# Patient Record
Sex: FEMALE | Race: WHITE
[De-identification: names, ages, dates, MRNs, and addresses within clinical notes are randomized per-mention and may not be internally consistent; named-entity substitution may affect disease eponyms.]

---

## 2018-01-22 ENCOUNTER — HOSPITAL ENCOUNTER (EMERGENCY)
Dept: HOSPITAL 26 - MED | Age: 58
Discharge: HOME | End: 2018-01-22
Payer: COMMERCIAL

## 2018-01-22 VITALS — SYSTOLIC BLOOD PRESSURE: 137 MMHG | DIASTOLIC BLOOD PRESSURE: 84 MMHG

## 2018-01-22 VITALS — HEIGHT: 63 IN | WEIGHT: 137.25 LBS | BODY MASS INDEX: 24.32 KG/M2

## 2018-01-22 VITALS — SYSTOLIC BLOOD PRESSURE: 120 MMHG | DIASTOLIC BLOOD PRESSURE: 68 MMHG

## 2018-01-22 DIAGNOSIS — J11.1: ICD-10-CM

## 2018-01-22 DIAGNOSIS — Z88.2: ICD-10-CM

## 2018-01-22 DIAGNOSIS — J98.01: Primary | ICD-10-CM

## 2018-01-22 PROCEDURE — 99284 EMERGENCY DEPT VISIT MOD MDM: CPT

## 2018-01-22 PROCEDURE — 94640 AIRWAY INHALATION TREATMENT: CPT

## 2018-01-22 PROCEDURE — 96372 THER/PROPH/DIAG INJ SC/IM: CPT

## 2018-01-22 PROCEDURE — 71045 X-RAY EXAM CHEST 1 VIEW: CPT

## 2018-01-22 NOTE — NUR
PT C/O DRY HACKING COUGH, FEVER AND CHILLS X5 DAYS. RR EVEN AND UNLABORED, BL 
BS CLEAR THROUGHOUT. PT AA&OX4.

## 2018-05-07 ENCOUNTER — HOSPITAL ENCOUNTER (INPATIENT)
Dept: HOSPITAL 26 - MED | Age: 58
LOS: 1 days | Discharge: HOME | DRG: 241 | End: 2018-05-08
Attending: INTERNAL MEDICINE | Admitting: INTERNAL MEDICINE
Payer: COMMERCIAL

## 2018-05-07 VITALS — HEIGHT: 62 IN | BODY MASS INDEX: 24.48 KG/M2 | WEIGHT: 133.04 LBS

## 2018-05-07 VITALS — SYSTOLIC BLOOD PRESSURE: 128 MMHG | DIASTOLIC BLOOD PRESSURE: 81 MMHG

## 2018-05-07 VITALS — SYSTOLIC BLOOD PRESSURE: 111 MMHG | DIASTOLIC BLOOD PRESSURE: 75 MMHG

## 2018-05-07 VITALS — DIASTOLIC BLOOD PRESSURE: 85 MMHG | SYSTOLIC BLOOD PRESSURE: 125 MMHG

## 2018-05-07 VITALS — DIASTOLIC BLOOD PRESSURE: 76 MMHG | SYSTOLIC BLOOD PRESSURE: 116 MMHG

## 2018-05-07 DIAGNOSIS — R94.31: ICD-10-CM

## 2018-05-07 DIAGNOSIS — R19.7: ICD-10-CM

## 2018-05-07 DIAGNOSIS — K29.70: Primary | ICD-10-CM

## 2018-05-07 DIAGNOSIS — Z88.2: ICD-10-CM

## 2018-05-07 DIAGNOSIS — R42: ICD-10-CM

## 2018-05-07 DIAGNOSIS — Z98.891: ICD-10-CM

## 2018-05-07 LAB
ALBUMIN FLD-MCNC: 3.9 G/DL (ref 3.4–5)
ANION GAP SERPL CALCULATED.3IONS-SCNC: 13.4 MMOL/L (ref 8–16)
APPEARANCE UR: CLEAR
AST SERPL-CCNC: 20 U/L (ref 15–37)
BASOPHILS # BLD AUTO: 0.1 K/UL (ref 0–0.22)
BASOPHILS NFR BLD AUTO: 0.8 % (ref 0–2)
BILIRUB SERPL-MCNC: 0.3 MG/DL (ref 0–1)
BILIRUB UR QL STRIP: NEGATIVE
BUN SERPL-MCNC: 15 MG/DL (ref 7–18)
CHLORIDE SERPL-SCNC: 102 MMOL/L (ref 98–107)
CK MB SERPL-MCNC: 1.3 NG/ML (ref 0–3.6)
CO2 SERPL-SCNC: 28.3 MMOL/L (ref 21–32)
COLOR UR: YELLOW
CREAT SERPL-MCNC: 1 MG/DL (ref 0.6–1.3)
EOSINOPHIL # BLD AUTO: 0.1 K/UL (ref 0–0.4)
EOSINOPHIL NFR BLD AUTO: 1.6 % (ref 0–4)
ERYTHROCYTE [DISTWIDTH] IN BLOOD BY AUTOMATED COUNT: 14 % (ref 11.6–13.7)
GFR SERPL CREATININE-BSD FRML MDRD: 73 ML/MIN (ref 90–?)
GLUCOSE SERPL-MCNC: 140 MG/DL (ref 74–106)
GLUCOSE UR STRIP-MCNC: NEGATIVE MG/DL
HCT VFR BLD AUTO: 44.2 % (ref 36–48)
HGB BLD-MCNC: 14.7 G/DL (ref 12–16)
HGB UR QL STRIP: (no result)
LEUKOCYTE ESTERASE UR QL STRIP: NEGATIVE
LIPASE SERPL-CCNC: 195 U/L (ref 73–393)
LYMPHOCYTES # BLD AUTO: 2.4 K/UL (ref 2.5–16.5)
LYMPHOCYTES NFR BLD AUTO: 32.6 % (ref 20.5–51.1)
MCH RBC QN AUTO: 29 PG (ref 27–31)
MCHC RBC AUTO-ENTMCNC: 33 G/DL (ref 33–37)
MCV RBC AUTO: 88.4 FL (ref 80–94)
MONOCYTES # BLD AUTO: 0.7 K/UL (ref 0.8–1)
MONOCYTES NFR BLD AUTO: 9.1 % (ref 1.7–9.3)
NEUTROPHILS # BLD AUTO: 4.2 K/UL (ref 1.8–7.7)
NEUTROPHILS NFR BLD AUTO: 55.9 % (ref 42.2–75.2)
NITRITE UR QL STRIP: NEGATIVE
PH UR STRIP: 7 [PH] (ref 5–9)
PLATELET # BLD AUTO: 355 K/UL (ref 140–450)
POTASSIUM SERPL-SCNC: 3.7 MMOL/L (ref 3.5–5.1)
RBC # BLD AUTO: 4.99 MIL/UL (ref 4.2–5.4)
RBC #/AREA URNS HPF: (no result) /HPF (ref 0–5)
SODIUM SERPL-SCNC: 140 MMOL/L (ref 136–145)
WBC # BLD AUTO: 7.5 K/UL (ref 4.8–10.8)
WBC,URINE: (no result) /HPF (ref 0–5)

## 2018-05-07 PROCEDURE — 96374 THER/PROPH/DIAG INJ IV PUSH: CPT

## 2018-05-07 PROCEDURE — G0378 HOSPITAL OBSERVATION PER HR: HCPCS

## 2018-05-07 PROCEDURE — 99218: CPT

## 2018-05-07 PROCEDURE — 96361 HYDRATE IV INFUSION ADD-ON: CPT

## 2018-05-07 PROCEDURE — 99285 EMERGENCY DEPT VISIT HI MDM: CPT

## 2018-05-07 RX ADMIN — MECLIZINE SCH MG: 25 TABLET ORAL at 16:26

## 2018-05-07 RX ADMIN — SODIUM CHLORIDE SCH MLS/HR: 9 INJECTION, SOLUTION INTRAVENOUS at 11:40

## 2018-05-07 RX ADMIN — SODIUM CHLORIDE SCH MLS/HR: 9 INJECTION, SOLUTION INTRAVENOUS at 21:52

## 2018-05-07 RX ADMIN — MECLIZINE SCH MG: 25 TABLET ORAL at 12:52

## 2018-05-07 NOTE — NUR
NS 1000 ML STARTED. IV PATENT, ASYMPTOMATIC, AND INTACT INFUSING IT WELL. PATIENT RESTING IN 
BED, FAMILY MEMBER AT BEDSIDE. NO SIGNS OF DISTRESS OR SOB NOTED ON ROOM AIR. RESPIRATIONS 
EVEN AND UNLABORED. PATIENT DENIES PAIN. SAFETY PRECAUTION IN PLACE, CALL LIGHT WITHIN 
REACH, WILL CONTINUE TO MONITOR PATIENT.

## 2018-05-07 NOTE — NUR
PT. IN BED RESTING , RR EVEN AND UNLABORED. BED IN LOWEST POSITION, SISTER AT 
BEDSIDE . WILL CONTINUE TO MONITOR.

## 2018-05-07 NOTE — NUR
RECEIVED BEDSIDE REPORT FROM DAY SHIFT NURSE, PT IS AAOX4. AMBULATORY. Mongolian SPEAKING. NO 
S/S OF DISTRESS NOTED ON ROOM AIR, IV TO R AC 20G, INTACT, PATENT, FLUSHED WELL. PT DENIES 
PAIN. ORIENTED PT TO ROOM. VITALS TAKEN. INITIAL ASSESSMENT DONE, ALL SAFETY PRECAUTION MET 
WILL CONTINUE TO MONITOR.

## 2018-05-07 NOTE — NUR
REPORT GIVEN TO NIGHT SHIFT NURSE AT BEDSIDE FOR CONTINUITY OF CARE. PATIENT SLEEPING AND IN 
STABLE CONDITION.

## 2018-05-07 NOTE — NUR
Patient will be admitted to care of DR. GARIBAY . Admited to TELEMETRY FLOOR . 
 Will go to room 104B. Belongings list completed.  Report to MINDA CRAIG .

## 2018-05-07 NOTE — NUR
PATIENT RESTING IN BED, NO SIGNS OF DISTRESS OR SOB NOTED ON ROOM AIR. RESPIRATIONS EVEN AND 
UNLABORED. PATIENT DENIES PAIN. SAFETY PRECAUTION IN PLACE, CALL LIGHT WITHIN REACH, WILL 
CONTINUE TO MONITOR PATIENT.

## 2018-05-07 NOTE — NUR
PATIENT ARRIVED VIA [facility] ACCOMPANIED BY 2 ER NURSES. REPORT GIVEN AT BEDSIDE FROM ER RN, 
BRII, FOR CONTINUITY OF CARE. PATIENT AMBULATED FROM [facility] TO BED ON STEADY GAIT. IV ON 
L AC #20G, INTACT, ASYMPTOMATIC, AND PATENT. PATIENT SHOWS NO SIGN OF DISTRESS OR SOB ON 
ROOM AIR. PATIENT DENIES PAIN. BOWEL SOUNDS PRESENT, PATIENT DX IS V/D AND ABNORMAL ECG. 
VITAL SIGNS WITHIN NORMAL LIMITS. ORIENTED PATIENT TO CALL LIGHT, PHONE, AND ROOM. PATIENT 
VERBALIZED UNDERSTANDING. UPDATED PATIENT ON PLAN OF CARE. PATIENT VERBALIZED UNDERSTANDING. 
SAFETY PRECAUTION IN PLACE, CALL LIGHT WITHIN REACH, WILL CONTINUE TO MONITOR PATIENT.

## 2018-05-07 NOTE — NUR
ORDERED MEDICATIONS ADMINISTERED. PATIENT TOLERATING IT WELL. NO SIGNS OF DISTRESS OR SOB 
NOTED ROOM AIR. RESPIRATIONS EVEN AND UNLABORED. PATIENT DENIES PAIN. SAFETY PRECAUTION IN 
PLACE, CALL LIGHT WITHIN REACH, WILL CONTINUE TO MONITOR PATIENT.

## 2018-05-07 NOTE — NUR
PT. CAME INTO ED W/ C/O OF VOMITING AND DIAHRRHEA X 3 DAYS. PT. STATES " I HAVE 
GASTRITIS BUT I HAVE BEEN VOMITING AND HAVING DIAHRRHEA X 3 DAYS". PT. AAOX4, 
SAYS SHE HAS BEEN DRINKING WATER AND EATING SMALL AMOUNTS. PT. DENIES ANY BLOOD 
IN STOOL OR VOMIT. PT. STATES I HAVE ABD PAIN THAT IS A BURNING SENSATION ALL 
OVER THE ABD 8/10. PT. DENIES FEVER, DENIES SOB, DENIES COUGH. SAFETY 
PRECAUTIONS INITIATED. ANKUR JOHNSON NOTIFIED. WILL CONTINUE TO MONITOR. SISTER AT 
BEDSIDE

## 2018-05-08 VITALS — SYSTOLIC BLOOD PRESSURE: 121 MMHG | DIASTOLIC BLOOD PRESSURE: 78 MMHG

## 2018-05-08 VITALS — SYSTOLIC BLOOD PRESSURE: 118 MMHG | DIASTOLIC BLOOD PRESSURE: 76 MMHG

## 2018-05-08 VITALS — DIASTOLIC BLOOD PRESSURE: 64 MMHG | SYSTOLIC BLOOD PRESSURE: 101 MMHG

## 2018-05-08 VITALS — DIASTOLIC BLOOD PRESSURE: 73 MMHG | SYSTOLIC BLOOD PRESSURE: 112 MMHG

## 2018-05-08 LAB
ALBUMIN FLD-MCNC: 3.4 G/DL (ref 3.4–5)
ANION GAP SERPL CALCULATED.3IONS-SCNC: 15.5 MMOL/L (ref 8–16)
AST SERPL-CCNC: 19 U/L (ref 15–37)
BASOPHILS # BLD AUTO: 0 K/UL (ref 0–0.22)
BASOPHILS NFR BLD AUTO: 0.7 % (ref 0–2)
BILIRUB SERPL-MCNC: 0.5 MG/DL (ref 0–1)
BUN SERPL-MCNC: 10 MG/DL (ref 7–18)
CHLORIDE SERPL-SCNC: 107 MMOL/L (ref 98–107)
CK MB SERPL-MCNC: 0.8 NG/ML (ref 0–3.6)
CO2 SERPL-SCNC: 25.3 MMOL/L (ref 21–32)
CREAT SERPL-MCNC: 1 MG/DL (ref 0.6–1.3)
EOSINOPHIL # BLD AUTO: 0.1 K/UL (ref 0–0.4)
EOSINOPHIL NFR BLD AUTO: 1.3 % (ref 0–4)
ERYTHROCYTE [DISTWIDTH] IN BLOOD BY AUTOMATED COUNT: 14.1 % (ref 11.6–13.7)
GFR SERPL CREATININE-BSD FRML MDRD: 73 ML/MIN (ref 90–?)
GLUCOSE SERPL-MCNC: 111 MG/DL (ref 74–106)
HCT VFR BLD AUTO: 39.5 % (ref 36–48)
HGB BLD-MCNC: 13.3 G/DL (ref 12–16)
LYMPHOCYTES # BLD AUTO: 2.7 K/UL (ref 2.5–16.5)
LYMPHOCYTES NFR BLD AUTO: 42.4 % (ref 20.5–51.1)
MCH RBC QN AUTO: 30 PG (ref 27–31)
MCHC RBC AUTO-ENTMCNC: 34 G/DL (ref 33–37)
MCV RBC AUTO: 88.1 FL (ref 80–94)
MONOCYTES # BLD AUTO: 0.5 K/UL (ref 0.8–1)
MONOCYTES NFR BLD AUTO: 7.7 % (ref 1.7–9.3)
NEUTROPHILS # BLD AUTO: 3.1 K/UL (ref 1.8–7.7)
NEUTROPHILS NFR BLD AUTO: 47.9 % (ref 42.2–75.2)
PLATELET # BLD AUTO: 313 K/UL (ref 140–450)
POTASSIUM SERPL-SCNC: 3.8 MMOL/L (ref 3.5–5.1)
RBC # BLD AUTO: 4.48 MIL/UL (ref 4.2–5.4)
SODIUM SERPL-SCNC: 144 MMOL/L (ref 136–145)
WBC # BLD AUTO: 6.5 K/UL (ref 4.8–10.8)

## 2018-05-08 RX ADMIN — SODIUM CHLORIDE SCH MLS/HR: 9 INJECTION, SOLUTION INTRAVENOUS at 08:21

## 2018-05-08 RX ADMIN — MECLIZINE SCH MG: 25 TABLET ORAL at 13:07

## 2018-05-08 RX ADMIN — MECLIZINE SCH MG: 25 TABLET ORAL at 08:21

## 2018-05-08 NOTE — NUR
ORDERED MEDICATIONS ADMINISTERED. PATIENT TOLERATED THEM WELL.   NO SIGNS OF DISTRESS OR SOB 
NOTED ROOM AIR. RESPIRATIONS EVEN AND UNLABORED. PATIENT DENIES PAIN AT THE MOMENT. CHARGE 
NURSE DILAN CALLED DR. JORGE RUSS. HE WILL COME TODAY TO SEE PATIENT. PASSED MESSAGE ON TO 
PATIENT, SHE VERBALIZED UNDERSTANDING. SAFETY PRECAUTION IN PLACE, CALL LIGHT WITHIN REACH, 
WILL CONTINUE TO MONITOR PATIENT.

## 2018-05-08 NOTE — NUR
REPORT RECEIVED AT BEDSIDE FROM NIGHT SHIFT NURSE FOR CONTINUITY OF CARE. PATIENT AWAKE, 
ALERT, AND ORIENTED X4. IV ON L AC #20G, INTACT, ASYMPTOMATIC, AND PATENT INFUSING IVF WELL. 
PATIENT SHOWS NO SIGN OF DISTRESS OR SOB ON ROOM AIR. PATIENT MEDICATED FOR BACK PAIN WITH 
TYLENOL AT 0511, STATES THAT PAIN IS "TOLERABLE". UPDATED BOARD, VERBALIZED PLAN OF CARE 
WITH PATIENT, PATIENT VERBALIZED UNDERSTANDING. SAFETY PRECAUTION IN PLACE, CALL LIGHT 
WITHIN REACH, WILL CONTINUE TO MONITOR PATIENT.

## 2018-05-08 NOTE — NUR
ORDERED MEDICATION ADMINISTERED. PATIENT TOLERATED IT WELL.   NO SIGNS OF DISTRESS OR SOB 
NOTED ON ROOM AIR. RESPIRATIONS EVEN AND UNLABORED. PATIENT DENIES PAIN. SAFETY PRECAUTION 
IN PLACE, CALL LIGHT WITHIN REACH, WILL CONTINUE TO MONITOR PATIENT.

## 2018-05-08 NOTE — NUR
PATIENT RESTING IN BED, NO SIGNS OF DISTRESS OR SOB NOTED ON ROOM AIR. RESPIRATIONS EVEN AND 
UNLABORED. PATIENT DENIES PAIN. NO C/O OF NAUSEA, VOMITING, OR DIARRHEA. SAFETY PRECAUTION 
IN PLACE, CALL LIGHT WITHIN REACH, WILL CONTINUE TO MONITOR PATIENT.

## 2018-05-08 NOTE — NUR
DISCHARGE INSTRUCTION AND EDUCATIVE GIVEN. PATIENT VERBALIZED UNDERSTANDING. IV REMOVED, IV 
CATHETER INTACT, MINIMAL BLOOD NOTED. ID BANDS CUT, TELE MONITOR REMOVED. PATIENT WILL NOW 
CHANGE AND WAIT FOR HER SON TO COME TO PICK HER UP. PATIENT DEMONSTRATES NO SIGNS OF 
DISTRESS OR SOB ON ROOM AIR. PATIENT DENIES PAIN. WILL CONTINUE TO MONITOR PATIENT.

## 2018-05-08 NOTE — NUR
ENDORSED PT CARE TO DAY SHIFT NURSE [place]-RN FOR CONTINUITY OF CARE. PT IN STABLE CONDITION AT 
THIS TIME.

## 2018-05-08 NOTE — NUR
PATIENT AND SON REQUESTED INFORMATION ABOUT ABNORMAL ECG. INFORMED PATIENT ABOUT ACCESSING 
MEDICAL INFORMATION AFTER DISCHARGED. CALLED ADMITTING TO GET PATIENT'S USERNAME AND 
PASSWORD. THEY SAID TO JUST GO TO THE LOBBY, THEY WILL GIVE THEM THE INFORMATION THERE. 
PATIENT AMBULATED OFF FLOOR FLOOR ACCOMPANIED BY RN AND SON. PATIENT TOOK ALL HER BELONGINGS 
WITH HER.  STOPPED BY ADMITTING DESK, SON GAVE HIS EMAIL ADDRESS, THEY SAID THEY WILL EMAIL 
HIM HER USERNAME AND PASSWORD. PATIENT IN STABLE CONDITION.

## 2018-05-08 NOTE — NUR
5/8/18 

RD INITIAL ASSESSMENT COMPLETED



PLEASE REFER TO NUTRITION ASSESSMENT UNDER CARE ACTIVITY FOR ESTIMATED NUTRITIONAL NEEDS. 



1.CONTINUE CLEAR LIQUIDS AS MEDICALLY APPROPRIATE 

2.IF/WHEN MEDICALLY APPROPRIATE TO ADVANCE PT, CONSIDER ADVANCEMENT TO REGULAR DIET AS 
TOLERATED

3.RD TO FOLLOW-UP 2-3 DAYS, HIGH RISK 



EM MANDUJANO, ROSI

## 2018-05-08 NOTE — NUR
PATIENT HAS BEEN SCREENED AND CATEGORIZED AS HIGH NUTRITION RISK. PATIENT WILL BE SEEN 
WITHIN 1-2 DAYS OF ADMISSION.



5/6/18 5/8/18



CLEO MANDUJANO RD


-------------------------------------------------------------------------------

Addendum: 05/08/18 at 1537 by Cleo Mandujano RD

-------------------------------------------------------------------------------

PT WILL BE SEEN WITHIN 1-2 DAYS OF ADMISSION



5/8/18



CLEO MANDUJANO RD

## 2018-12-15 ENCOUNTER — HOSPITAL ENCOUNTER (EMERGENCY)
Dept: HOSPITAL 26 - MED | Age: 58
Discharge: HOME | End: 2018-12-15
Payer: COMMERCIAL

## 2018-12-15 VITALS — SYSTOLIC BLOOD PRESSURE: 132 MMHG | DIASTOLIC BLOOD PRESSURE: 89 MMHG

## 2018-12-15 VITALS — SYSTOLIC BLOOD PRESSURE: 155 MMHG | DIASTOLIC BLOOD PRESSURE: 97 MMHG

## 2018-12-15 VITALS — BODY MASS INDEX: 24.45 KG/M2 | WEIGHT: 138 LBS | HEIGHT: 63 IN

## 2018-12-15 DIAGNOSIS — R11.0: ICD-10-CM

## 2018-12-15 DIAGNOSIS — Z79.899: ICD-10-CM

## 2018-12-15 DIAGNOSIS — R10.11: Primary | ICD-10-CM

## 2018-12-15 DIAGNOSIS — E07.9: ICD-10-CM

## 2018-12-15 PROCEDURE — 99283 EMERGENCY DEPT VISIT LOW MDM: CPT

## 2018-12-15 PROCEDURE — 96372 THER/PROPH/DIAG INJ SC/IM: CPT

## 2018-12-15 PROCEDURE — 81002 URINALYSIS NONAUTO W/O SCOPE: CPT

## 2018-12-15 NOTE — NUR
Patient discharged with v/s stable. Written and verbal after care instructions 
given and explained. 

Patient alert, oriented and verbalized understanding of instructions. 
Ambulatory with . All questions addressed prior to discharge. ID band removed. 
Patient advised to follow up with PMD. Rx of ZOFRAN & MOTRIN given. Patient 
educated on indication of medication including possible reaction and side 
effects. Opportunity to ask questions provided and answered.

## 2018-12-15 NOTE — NUR
bib son with c/o HA, DIZINESS, N/V & ruq radiating to right mid back  x 3 
days;MED  hx--"liver problems...cyst"...biopsy scheduled; h. pylori, 
hypothyroidism. rx--Zofran 4mg, Levothroid 50mcg.

SKIN IS PINK/WARM/DRY; AAOX4 WITH EVEN AND STEADY GAIT; LUNGS CLEAR BL. PATIENT 
STATES PAIN OF 9/10 AT THIS TIME. PATIENT POSITIONED FOR COMFORT; HOB ELEVATED; 
BEDRAILS UP X2; BED DOWN. ER MD MADE AWARE OF PT STATUS.

## 2019-03-02 ENCOUNTER — HOSPITAL ENCOUNTER (EMERGENCY)
Dept: HOSPITAL 26 - MED | Age: 59
Discharge: HOME | End: 2019-03-02
Payer: COMMERCIAL

## 2019-03-02 VITALS — DIASTOLIC BLOOD PRESSURE: 98 MMHG | SYSTOLIC BLOOD PRESSURE: 155 MMHG

## 2019-03-02 VITALS — BODY MASS INDEX: 20.73 KG/M2 | HEIGHT: 63 IN | WEIGHT: 117 LBS

## 2019-03-02 VITALS — SYSTOLIC BLOOD PRESSURE: 150 MMHG | DIASTOLIC BLOOD PRESSURE: 83 MMHG

## 2019-03-02 DIAGNOSIS — Z88.2: ICD-10-CM

## 2019-03-02 DIAGNOSIS — N17.9: Primary | ICD-10-CM

## 2019-03-02 DIAGNOSIS — E07.9: ICD-10-CM

## 2019-03-02 DIAGNOSIS — E86.0: ICD-10-CM

## 2019-03-02 DIAGNOSIS — Z79.899: ICD-10-CM

## 2019-03-02 LAB
ALBUMIN FLD-MCNC: 3 G/DL (ref 3.4–5)
ANION GAP SERPL CALCULATED.3IONS-SCNC: 13.2 MMOL/L (ref 8–16)
AST SERPL-CCNC: 33 U/L (ref 15–37)
BASOPHILS # BLD AUTO: 0.1 K/UL (ref 0–0.22)
BASOPHILS NFR BLD AUTO: 1.9 % (ref 0–2)
BILIRUB SERPL-MCNC: 0.3 MG/DL (ref 0–1)
BUN SERPL-MCNC: 39 MG/DL (ref 7–18)
CHLORIDE SERPL-SCNC: 105 MMOL/L (ref 98–107)
CO2 SERPL-SCNC: 27.3 MMOL/L (ref 21–32)
CREAT SERPL-MCNC: 3.3 MG/DL (ref 0.6–1.3)
EOSINOPHIL # BLD AUTO: 0.1 K/UL (ref 0–0.4)
EOSINOPHIL NFR BLD AUTO: 1 % (ref 0–4)
ERYTHROCYTE [DISTWIDTH] IN BLOOD BY AUTOMATED COUNT: 14.4 % (ref 11.6–13.7)
GFR SERPL CREATININE-BSD FRML MDRD: 18 ML/MIN (ref 90–?)
GLUCOSE SERPL-MCNC: 112 MG/DL (ref 74–106)
HCT VFR BLD AUTO: 51 % (ref 36–48)
HGB BLD-MCNC: 16.8 G/DL (ref 12–16)
LYMPHOCYTES # BLD AUTO: 1.6 K/UL (ref 2.5–16.5)
LYMPHOCYTES NFR BLD AUTO: 23.3 % (ref 20.5–51.1)
MCH RBC QN AUTO: 28 PG (ref 27–31)
MCHC RBC AUTO-ENTMCNC: 33 G/DL (ref 33–37)
MCV RBC AUTO: 84.6 FL (ref 80–94)
MONOCYTES # BLD AUTO: 0.5 K/UL (ref 0.8–1)
MONOCYTES NFR BLD AUTO: 8.1 % (ref 1.7–9.3)
NEUTROPHILS # BLD AUTO: 4.5 K/UL (ref 1.8–7.7)
NEUTROPHILS NFR BLD AUTO: 65.7 % (ref 42.2–75.2)
PLATELET # BLD AUTO: 322 K/UL (ref 140–450)
POTASSIUM SERPL-SCNC: 3.5 MMOL/L (ref 3.5–5.1)
RBC # BLD AUTO: 6.03 MIL/UL (ref 4.2–5.4)
SODIUM SERPL-SCNC: 142 MMOL/L (ref 136–145)
WBC # BLD AUTO: 6.8 K/UL (ref 4.8–10.8)

## 2019-03-02 PROCEDURE — 80053 COMPREHEN METABOLIC PANEL: CPT

## 2019-03-02 PROCEDURE — 99284 EMERGENCY DEPT VISIT MOD MDM: CPT

## 2019-03-02 PROCEDURE — 96360 HYDRATION IV INFUSION INIT: CPT

## 2019-03-02 PROCEDURE — 85025 COMPLETE CBC W/AUTO DIFF WBC: CPT

## 2019-03-02 PROCEDURE — 36415 COLL VENOUS BLD VENIPUNCTURE: CPT

## 2019-03-02 PROCEDURE — 71045 X-RAY EXAM CHEST 1 VIEW: CPT

## 2019-03-02 NOTE — NUR
BIB SON WITH C/O ABNORMAL LABS. PT STATES SHE WAS SENT HER FROM HER DR TO CHECK 
LABS FOR KIDNEY PROBLEMS 



PMH: AMYLOIDOSIS . DENIES N/V/D; SKIN IS PINK/WARM/DRY; AAOX4 WITH EVEN AND 
STEADY GAIT; LUNGS CLEAR BL; HR EVEN AND REGULAR; PT DENIES ANY FEVER, CP, SOB, 
OR COUGH AT THIS TIME; PATIENT STATES PAIN OF 0/10 AT THIS TIME; VSS; PATIENT 
POSITIONED FOR COMFORT; HOB ELEVATED; BEDRAILS UP X2; BED DOWN. ER MD MADE 
AWARE OF PT STATUS.

## 2019-03-16 ENCOUNTER — HOSPITAL ENCOUNTER (INPATIENT)
Dept: HOSPITAL 26 - MED | Age: 59
LOS: 2 days | Discharge: HOME | DRG: 346 | End: 2019-03-18
Attending: INTERNAL MEDICINE | Admitting: INTERNAL MEDICINE
Payer: COMMERCIAL

## 2019-03-16 VITALS — HEIGHT: 64 IN | BODY MASS INDEX: 19.81 KG/M2 | WEIGHT: 116 LBS

## 2019-03-16 VITALS — SYSTOLIC BLOOD PRESSURE: 141 MMHG | DIASTOLIC BLOOD PRESSURE: 90 MMHG

## 2019-03-16 VITALS — SYSTOLIC BLOOD PRESSURE: 128 MMHG | DIASTOLIC BLOOD PRESSURE: 87 MMHG

## 2019-03-16 VITALS — SYSTOLIC BLOOD PRESSURE: 140 MMHG | DIASTOLIC BLOOD PRESSURE: 90 MMHG

## 2019-03-16 DIAGNOSIS — Z79.899: ICD-10-CM

## 2019-03-16 DIAGNOSIS — Z88.2: ICD-10-CM

## 2019-03-16 DIAGNOSIS — K59.00: ICD-10-CM

## 2019-03-16 DIAGNOSIS — E86.0: ICD-10-CM

## 2019-03-16 DIAGNOSIS — K85.10: ICD-10-CM

## 2019-03-16 DIAGNOSIS — K80.20: ICD-10-CM

## 2019-03-16 DIAGNOSIS — E44.0: ICD-10-CM

## 2019-03-16 DIAGNOSIS — N17.0: ICD-10-CM

## 2019-03-16 DIAGNOSIS — E03.9: ICD-10-CM

## 2019-03-16 DIAGNOSIS — E85.9: Primary | ICD-10-CM

## 2019-03-16 LAB
ALBUMIN FLD-MCNC: 2.6 G/DL (ref 3.4–5)
ANION GAP SERPL CALCULATED.3IONS-SCNC: 13.5 MMOL/L (ref 8–16)
AST SERPL-CCNC: 45 U/L (ref 15–37)
BASOPHILS # BLD AUTO: 0.1 K/UL (ref 0–0.22)
BASOPHILS NFR BLD AUTO: 1.1 % (ref 0–2)
BILIRUB SERPL-MCNC: 0.4 MG/DL (ref 0–1)
BUN SERPL-MCNC: 37 MG/DL (ref 7–18)
CHLORIDE SERPL-SCNC: 103 MMOL/L (ref 98–107)
CO2 SERPL-SCNC: 26.2 MMOL/L (ref 21–32)
CREAT SERPL-MCNC: 2.4 MG/DL (ref 0.6–1.3)
EOSINOPHIL # BLD AUTO: 0.1 K/UL (ref 0–0.4)
EOSINOPHIL NFR BLD AUTO: 1.2 % (ref 0–4)
ERYTHROCYTE [DISTWIDTH] IN BLOOD BY AUTOMATED COUNT: 15.2 % (ref 11.6–13.7)
GFR SERPL CREATININE-BSD FRML MDRD: 26 ML/MIN (ref 90–?)
GLUCOSE SERPL-MCNC: 101 MG/DL (ref 74–106)
HCT VFR BLD AUTO: 48.2 % (ref 36–48)
HGB BLD-MCNC: 16.3 G/DL (ref 12–16)
LYMPHOCYTES # BLD AUTO: 2.8 K/UL (ref 2.5–16.5)
LYMPHOCYTES NFR BLD AUTO: 33.7 % (ref 20.5–51.1)
MCH RBC QN AUTO: 29 PG (ref 27–31)
MCHC RBC AUTO-ENTMCNC: 34 G/DL (ref 33–37)
MCV RBC AUTO: 84.5 FL (ref 80–94)
MONOCYTES # BLD AUTO: 0.8 K/UL (ref 0.8–1)
MONOCYTES NFR BLD AUTO: 9.1 % (ref 1.7–9.3)
NEUTROPHILS # BLD AUTO: 4.6 K/UL (ref 1.8–7.7)
NEUTROPHILS NFR BLD AUTO: 54.9 % (ref 42.2–75.2)
PLATELET # BLD AUTO: 383 K/UL (ref 140–450)
POTASSIUM SERPL-SCNC: 3.7 MMOL/L (ref 3.5–5.1)
RBC # BLD AUTO: 5.7 MIL/UL (ref 4.2–5.4)
SODIUM SERPL-SCNC: 139 MMOL/L (ref 136–145)
WBC # BLD AUTO: 8.4 K/UL (ref 4.8–10.8)

## 2019-03-16 RX ADMIN — LANSOPRAZOLE SCH MG: 30 CAPSULE, DELAYED RELEASE ORAL at 09:14

## 2019-03-16 RX ADMIN — LEVOTHYROXINE SODIUM SCH MG: 50 TABLET ORAL at 09:09

## 2019-03-16 RX ADMIN — ENOXAPARIN SODIUM SCH MG: 30 INJECTION SUBCUTANEOUS at 09:00

## 2019-03-16 RX ADMIN — SODIUM CHLORIDE SCH MLS/HR: 9 INJECTION, SOLUTION INTRAVENOUS at 15:54

## 2019-03-16 RX ADMIN — SODIUM CHLORIDE SCH MLS/HR: 9 INJECTION, SOLUTION INTRAVENOUS at 05:16

## 2019-03-16 NOTE — NUR
PATIENT COMPLAINED OF 8/10 ABDOMINAL PAIN; TOLD PATIENT IT WAS TOO EARLY TO GIVE HER NORCO 
TABLET; SUGGESTED MORPHINE INSTEAD DUE TO HER PAIN SCALE. PATIENT AGREED; ADMINISTRATION 
CARRIED OUT. WILL CONTINUE TO MONITOR.

## 2019-03-16 NOTE — NUR
ADMINISTERED MORNING MEDS TO PT. PT TOLERATED WELL. PT REFUSED LOVENOX MEDICATION. I 
EXPLAINED BENEFIT OF MED AND PT VERBALIZED UNDERSTANDING BUT REFUSED IT ANYWAYS. ALL NEEDS 
MET AT THIS TIME. PT DENIES NAUSEA AT THIS TIME. TYLENOL WAS ALSO GIVEN FOR HEADACHE OF 
4/10. PATIENT REFUSED ANY STRONGER PAIN MEDS. ALL OTHER NEEDS MET. PT BED IN LOW POSITION, 
CALL LIGHT WITHIN REACH.

## 2019-03-16 NOTE — NUR
PT IN BED TALKING WITH VISITORS. PT COMPLAINTS OF NO PAIN AT THIS TIME. WILL CONTINUE TO 
ROUND FREQUENTLY.

## 2019-03-16 NOTE — NUR
PT WITH VISITORS IN ROOM. NO PAIN OR DISCOMFORT REPORTED. BED IN LOW POSITION, CALL LIGHT 
WITHIN REACH.

## 2019-03-16 NOTE — NUR
PT ASLEEP IN BED. SONS ARE AT BEDSIDE. PT STABLE, NO SIGNS OF PAIN OR DISTRESS. WILL ROUND 
FREQUENTLY.

## 2019-03-16 NOTE — NUR
RETURNED FROM US.

-------------------------------------------------------------------------------

Addendum: 03/16/19 at 0349 by [facility]

-------------------------------------------------------------------------------

PT RETURNED FROM CT.

## 2019-03-16 NOTE — NUR
Patient will be admitted to care of Dr. Hendrix. Admited to MS.  Will go to 
room 110B. Belongings list completed.  Report to JABARI Shipley.

## 2019-03-16 NOTE — NUR
VARUN. FROM ER FOR ADMISSION TO MED SURGICAL UNIT WITH COMPLAINT OF NAUSEA AND VOMITING FOR 
THREE DAYS NOW. AWAKE, A/OX4. RESPIRATION EVEN AND UNLABORED. AMBULATES INDEPENDENTLY. IV 
SALINE LOCK AT THE LEFT AC G 20, PATENT AND INTACT. DENIES PAIN 0/10. ORIENTED TO HOSPITAL 
ENVIRONMENT, USE OF CALL LIGHT AND TV. VERBALIZED UNDERSTANDING.

## 2019-03-16 NOTE — NUR
PATIENT PRESENTS TO ED WITH C/O NAUSEA, VOMITING AND HEADACHE. 

PT SKIN IS PINK/WARM/DRY; AAOX4 WITH EVEN AND STEADY GAIT; LUNGS CLEAR BL; HR 
EVEN AND REGULAR; PATIENT STATES PAIN OF 8/10 AT THIS TIME; VSS; PATIENT 
POSITIONED FOR COMFORT; HOB ELEVATED; BEDRAILS UP X2; BED DOWN. ER MD MADE 
AWARE OF PT STATUS.

## 2019-03-16 NOTE — NUR
RESTING COMFORTABLY IN BED. NO N/V NOTED. WILL ENDORSE TO AM NURSE FOR ADMISSION. CONDITION 
REMAIN STABLE.

## 2019-03-16 NOTE — NUR
RECEIVED ENDORSEMENT FROM AM SHIFT RN; PT IS AWAKE AND TALKING WITH FAMILY, A/Ox4, ABLE TO 
MAKE NEEDS KNOWN. INTRODUCED SELF, UPDATED BOARD. NO SOB OR DISTRESS NOTED. AMBULATES 
INDEPENDENTLY. IV SITE AT THE LEFT ANTECUBITAL, 20 GAUGE, PATENT AND INTACT. ORIENTED TO 
HOSPITAL ENVIRONMENT. BED IN THE LOWEST POSITION, CALL LIGHT WITHIN REACH. INITIAL 
ASSESSMENT DONE. WILL CONTINUE TO MONITOR.

## 2019-03-16 NOTE — NUR
RECEIVED REPORT FROM NIGHT SHIFT. PT IS AWAKE, A/OX4. RESPIRATION EVEN AND UNLABORED. 
AMBULATES INDEPENDENTLY. IV SALINE LOCK AT THE LEFT AC G 20, PATENT AND INTACT. DENIES PAIN 
0/10. ORIENTED TO HOSPITAL ENVIRONMENT, USE OF CALL LIGHT AND TV. VERBALIZED UNDERSTANDING. 
PT BED IN LOW POSITION. WILL ROUND FREQUENTLY.

## 2019-03-17 VITALS — DIASTOLIC BLOOD PRESSURE: 95 MMHG | SYSTOLIC BLOOD PRESSURE: 146 MMHG

## 2019-03-17 VITALS — DIASTOLIC BLOOD PRESSURE: 88 MMHG | SYSTOLIC BLOOD PRESSURE: 146 MMHG

## 2019-03-17 VITALS — SYSTOLIC BLOOD PRESSURE: 143 MMHG | DIASTOLIC BLOOD PRESSURE: 93 MMHG

## 2019-03-17 VITALS — DIASTOLIC BLOOD PRESSURE: 95 MMHG | SYSTOLIC BLOOD PRESSURE: 148 MMHG

## 2019-03-17 LAB
ALBUMIN FLD-MCNC: 2 G/DL (ref 3.4–5)
ANION GAP SERPL CALCULATED.3IONS-SCNC: 13.9 MMOL/L (ref 8–16)
AST SERPL-CCNC: 29 U/L (ref 15–37)
BASOPHILS # BLD AUTO: 0.1 K/UL (ref 0–0.22)
BASOPHILS NFR BLD AUTO: 1 % (ref 0–2)
BILIRUB SERPL-MCNC: 0.6 MG/DL (ref 0–1)
BUN SERPL-MCNC: 28 MG/DL (ref 7–18)
CHLORIDE SERPL-SCNC: 111 MMOL/L (ref 98–107)
CHOLEST/HDLC SERPL: 8.3 {RATIO} (ref 1–4.5)
CO2 SERPL-SCNC: 23.7 MMOL/L (ref 21–32)
CREAT SERPL-MCNC: 1.8 MG/DL (ref 0.6–1.3)
EOSINOPHIL # BLD AUTO: 0.1 K/UL (ref 0–0.4)
EOSINOPHIL NFR BLD AUTO: 1.3 % (ref 0–4)
ERYTHROCYTE [DISTWIDTH] IN BLOOD BY AUTOMATED COUNT: 15.2 % (ref 11.6–13.7)
GFR SERPL CREATININE-BSD FRML MDRD: 37 ML/MIN (ref 90–?)
GLUCOSE SERPL-MCNC: 99 MG/DL (ref 74–106)
HCT VFR BLD AUTO: 41.7 % (ref 36–48)
HDLC SERPL-MCNC: 33 MG/DL (ref 40–60)
HGB BLD-MCNC: 13.7 G/DL (ref 12–16)
LDLC SERPL CALC-MCNC: 201 MG/DL (ref 60–100)
LYMPHOCYTES # BLD AUTO: 1.8 K/UL (ref 2.5–16.5)
LYMPHOCYTES NFR BLD AUTO: 26.7 % (ref 20.5–51.1)
MAGNESIUM SERPL-MCNC: 2 MG/DL (ref 1.8–2.4)
MCH RBC QN AUTO: 28 PG (ref 27–31)
MCHC RBC AUTO-ENTMCNC: 33 G/DL (ref 33–37)
MCV RBC AUTO: 85.3 FL (ref 80–94)
MONOCYTES # BLD AUTO: 0.5 K/UL (ref 0.8–1)
MONOCYTES NFR BLD AUTO: 7.6 % (ref 1.7–9.3)
NEUTROPHILS # BLD AUTO: 4.2 K/UL (ref 1.8–7.7)
NEUTROPHILS NFR BLD AUTO: 63.4 % (ref 42.2–75.2)
PHOSPHATE SERPL-MCNC: 4.1 MG/DL (ref 2.5–4.9)
PLATELET # BLD AUTO: 327 K/UL (ref 140–450)
POTASSIUM SERPL-SCNC: 3.6 MMOL/L (ref 3.5–5.1)
RBC # BLD AUTO: 4.89 MIL/UL (ref 4.2–5.4)
SODIUM SERPL-SCNC: 145 MMOL/L (ref 136–145)
TRIGL SERPL-MCNC: 209 MG/DL (ref 30–150)
WBC # BLD AUTO: 6.6 K/UL (ref 4.8–10.8)

## 2019-03-17 RX ADMIN — SODIUM CHLORIDE PRN MG: 9 INJECTION, SOLUTION INTRAVENOUS at 07:58

## 2019-03-17 RX ADMIN — DOCUSATE SODIUM SCH MG: 100 CAPSULE, LIQUID FILLED ORAL at 21:30

## 2019-03-17 RX ADMIN — SODIUM CHLORIDE SCH MLS/HR: 9 INJECTION, SOLUTION INTRAVENOUS at 01:23

## 2019-03-17 RX ADMIN — SODIUM CHLORIDE SCH MLS/HR: 9 INJECTION, SOLUTION INTRAVENOUS at 13:05

## 2019-03-17 RX ADMIN — LEVOTHYROXINE SODIUM SCH MG: 50 TABLET ORAL at 06:41

## 2019-03-17 RX ADMIN — SODIUM CHLORIDE SCH MLS/HR: 9 INJECTION, SOLUTION INTRAVENOUS at 22:30

## 2019-03-17 RX ADMIN — LANSOPRAZOLE SCH MG: 30 CAPSULE, DELAYED RELEASE ORAL at 08:00

## 2019-03-17 RX ADMIN — ENOXAPARIN SODIUM SCH MG: 30 INJECTION SUBCUTANEOUS at 08:05

## 2019-03-17 RX ADMIN — SODIUM CHLORIDE PRN MG: 9 INJECTION, SOLUTION INTRAVENOUS at 16:27

## 2019-03-17 RX ADMIN — Medication SCH MG: at 08:00

## 2019-03-17 NOTE — NUR
PATIENT HAS BEEN SCREENED AND CATEGORIZED AS MODERATE NUTRITION RISK. PATIENT WILL BE SEEN 
WITHIN 3-5 DAYS OF ADMISSION.



3/18/19-3/20/19



BRIGID CHANG RD

## 2019-03-17 NOTE — NUR
PT IS ON THE PHONE AT THIS TIME. PT WAS MEDICATED WITH ZOFRAN FOR NAUSEA. PT STATED SHE FELT 
MUCH BETTER, ABD PAIN IS 3/10 TOLERABLE. DISCUSSED POC WITH PT, PT STATED "PER DR MUNGUIA, GALL 
BLADDER REMOVAL SX IS NECESSARY AT THIS TIME." TOLD PT TO WAIT FOR THE GI SPECIALIST DR BARAJAS, TO SEE IF SHE NEEDS ERCP. PT AGREES.

## 2019-03-17 NOTE — NUR
RECEIVED PT REPORT FROM NIGHT SHIFT RN AT BEDSIDE. PT IS AWAKE, ALERT, Ox4, ABLE TO MAKE 
NEEDS KNOWN. NO S/S OF DISTRESS NOTED ON ROOM AIR. PT AMBULATES INDEPENDENTLY. IV SITE TO 
LEFT AC 20 G, PATENT AND ASYMPTOMATIC. LUNGS CLEAR, NO EDEMA NOTED. BED IN THE LOWEST 
POSITION, CALL LIGHT WITHIN REACH. INITIAL ASSESSMENT DONE. WILL CONTINUE TO MONITOR.

## 2019-03-17 NOTE — NUR
RECEIVED PT FROM MAEGAN RN PT [facility] SPEAKER AAOX4 AMBULATORY IV ON LEFT AC INFUSIGN WELL 
DENIES ANY KPAINN ;OR DISCOMFORT INITIAL ASSESSMENT DONE

## 2019-03-18 VITALS — SYSTOLIC BLOOD PRESSURE: 142 MMHG | DIASTOLIC BLOOD PRESSURE: 82 MMHG

## 2019-03-18 VITALS — SYSTOLIC BLOOD PRESSURE: 151 MMHG | DIASTOLIC BLOOD PRESSURE: 93 MMHG

## 2019-03-18 VITALS — DIASTOLIC BLOOD PRESSURE: 88 MMHG | SYSTOLIC BLOOD PRESSURE: 140 MMHG

## 2019-03-18 LAB
ALBUMIN FLD-MCNC: 2.2 G/DL (ref 3.4–5)
ANION GAP SERPL CALCULATED.3IONS-SCNC: 14.1 MMOL/L (ref 8–16)
AST SERPL-CCNC: 32 U/L (ref 15–37)
BARBITURATES UR QL SCN: (no result) NG/ML
BASOPHILS # BLD AUTO: 0.1 K/UL (ref 0–0.22)
BASOPHILS NFR BLD AUTO: 0.9 % (ref 0–2)
BENZODIAZ UR QL SCN: (no result) NG/ML
BILIRUB SERPL-MCNC: 0.6 MG/DL (ref 0–1)
BUN SERPL-MCNC: 22 MG/DL (ref 7–18)
BZE UR QL SCN: (no result) NG/ML
CANNABINOIDS UR QL SCN: (no result) NG/ML
CHLORIDE SERPL-SCNC: 111 MMOL/L (ref 98–107)
CO2 SERPL-SCNC: 21.2 MMOL/L (ref 21–32)
CREAT SERPL-MCNC: 1.7 MG/DL (ref 0.6–1.3)
EOSINOPHIL # BLD AUTO: 0.1 K/UL (ref 0–0.4)
EOSINOPHIL NFR BLD AUTO: 2.1 % (ref 0–4)
ERYTHROCYTE [DISTWIDTH] IN BLOOD BY AUTOMATED COUNT: 15 % (ref 11.6–13.7)
GFR SERPL CREATININE-BSD FRML MDRD: 39 ML/MIN (ref 90–?)
GLUCOSE SERPL-MCNC: 87 MG/DL (ref 74–106)
HCT VFR BLD AUTO: 44.1 % (ref 36–48)
HGB BLD-MCNC: 14.6 G/DL (ref 12–16)
LYMPHOCYTES # BLD AUTO: 1.8 K/UL (ref 2.5–16.5)
LYMPHOCYTES NFR BLD AUTO: 26.7 % (ref 20.5–51.1)
MAGNESIUM SERPL-MCNC: 1.7 MG/DL (ref 1.8–2.4)
MCH RBC QN AUTO: 28 PG (ref 27–31)
MCHC RBC AUTO-ENTMCNC: 33 G/DL (ref 33–37)
MCV RBC AUTO: 85.3 FL (ref 80–94)
MONOCYTES # BLD AUTO: 0.7 K/UL (ref 0.8–1)
MONOCYTES NFR BLD AUTO: 10 % (ref 1.7–9.3)
NEUTROPHILS # BLD AUTO: 4 K/UL (ref 1.8–7.7)
NEUTROPHILS NFR BLD AUTO: 60.3 % (ref 42.2–75.2)
OPIATES UR QL SCN: (no result) NG/ML
PCP UR QL SCN: (no result) NG/ML
PHOSPHATE SERPL-MCNC: 3.5 MG/DL (ref 2.5–4.9)
PLATELET # BLD AUTO: 337 K/UL (ref 140–450)
POTASSIUM SERPL-SCNC: 3.3 MMOL/L (ref 3.5–5.1)
RBC # BLD AUTO: 5.17 MIL/UL (ref 4.2–5.4)
SODIUM SERPL-SCNC: 143 MMOL/L (ref 136–145)
WBC # BLD AUTO: 6.7 K/UL (ref 4.8–10.8)

## 2019-03-18 RX ADMIN — SODIUM CHLORIDE SCH MLS/HR: 9 INJECTION, SOLUTION INTRAVENOUS at 17:16

## 2019-03-18 RX ADMIN — SENNOSIDES A AND B SCH MG: 8.6 TABLET, FILM COATED ORAL at 16:53

## 2019-03-18 RX ADMIN — Medication SCH MG: at 09:21

## 2019-03-18 RX ADMIN — LANSOPRAZOLE SCH MG: 30 CAPSULE, DELAYED RELEASE ORAL at 09:22

## 2019-03-18 RX ADMIN — SODIUM CHLORIDE PRN MG: 9 INJECTION, SOLUTION INTRAVENOUS at 10:04

## 2019-03-18 RX ADMIN — SODIUM CHLORIDE SCH MLS/HR: 9 INJECTION, SOLUTION INTRAVENOUS at 06:31

## 2019-03-18 RX ADMIN — SENNOSIDES A AND B SCH MG: 8.6 TABLET, FILM COATED ORAL at 14:35

## 2019-03-18 RX ADMIN — DOCUSATE SODIUM SCH MG: 100 CAPSULE, LIQUID FILLED ORAL at 09:21

## 2019-03-18 RX ADMIN — ENOXAPARIN SODIUM SCH MG: 30 INJECTION SUBCUTANEOUS at 09:31

## 2019-03-18 RX ADMIN — LEVOTHYROXINE SODIUM SCH MG: 50 TABLET ORAL at 06:06

## 2019-03-18 NOTE — NUR
3/18/19 RD INITIAL ASSESSMENT COMPLETED



PLEASE REFER TO NUTRITION ASSESSMENT UNDER CARE ACTIVITY FOR ESTIMATED NUTRITIONAL NEEDS. 



1. CONTINUE CLEAR LIQUID DIET AS TOLERATED 

2. CONSIDER ADDING ENSURE CLEAR TID

3. CONSIDER ADVANCING DIET TO FULL LIQUIDS AS TOLERATED

4. RD TO FOLLOW-UP 2-3 DAYS, HIGH RISK 



EM MANDUJANO, ROSI

## 2019-03-18 NOTE — NUR
PATIENT HAS BEEN RE-SCREENED AND RE-CATEGORIZED AS HIGH NUTRITIONAL RISK DUE TO FNS 
REFERRAL. PT WILL BE SEEN WITHIN 1-2 DAYS FROM THE DATE THAT THE [facility] REFERRAL WAS RECEIVED.



03/18/19



EM MANDUJANO RD

## 2019-03-18 NOTE — NUR
CHANGED PT SHEETS, PILLOWCASE, GOWN AND BLANKETS AT THIS TIME. PT TOLERATED WELL. WILL 
CONTINUE TO MONITOR.

## 2019-03-18 NOTE — NUR
RECEIVED REPORT FROM NIGHT SHIFT NURSE KRISSY FOR CONTINUITY OF CARE. PT IN STABLE 
CONDITION. RESPIRATIONS EVEN AND UNLABORED. IV INTACT AND PATENT. SAFETY MEASURES IN PLACE. 
BED IN LOW POSITION. CALL LIGHT AT BEDSIDE. WILL CONTINUE TO MONITOR.

## 2019-03-18 NOTE — NUR
GAVE ORDERED DUE MEDICATIONS AT THIS TIME, PT TOLERATED WELL. WILL CONTINUE TO MONITOR. CALL 
LIGHT AT BEDSIDE. BED IN LOW POSITION.

## 2019-03-18 NOTE — NUR
GAVE DISCHARGE INSTRUCTIONS, PT VERBALIZED UNDERSTANDING OF INSTRUCTIONS. IV REMOVED, LUMEN 
INTACT. ID BAND REMOVED. PT WHEELED TO LOBBY WHERE RESPONSIBLE FAMILY MEMBER WAS WAITING 
WITH VEHICLE. PT IN STABLE CONDITION.

## 2019-03-18 NOTE — NUR
PT C/O NAUSEA AT THIS TIME. GAVE ONDANSETRON 2ML AS ORDERED PRN. PT TOLERATED WELL. WILL 
CONTINUE TO MONITOR.

## 2019-03-20 NOTE — NUR
CM NOTE



I ATTEMPTED TO MAKE AN OUTPATIENT FOLLOW UP APPOINTMENT.  PER FELICIANO OF [facility] 
PH# [phone], THE PATIENT HAS AN APPOINTMENT WITH DR. [facility] ON MARCH 21, 2019 11:10 
AM AT THE CLINIC AT [address] [place] [address].  PER FELICIANO, THEIR CLINIC HAS RECENTLY 
SPOKEN TO THE PATIENT TO MAKE HER AWARE OF HER OUTPATIENT FOLLOW UP SCHEDULE.

## 2021-07-26 NOTE — NUR
Assisted patient with completion of a POA for Health Care. Original and copies given to patient, copy placed in drawer for scanning into medical records.    Patient discharged with v/s stable. Written and verbal after care instructions 
given and explained. 

Patient alert, oriented and verbalized understanding of instructions. 
Ambulatory with steady gait. All questions addressed prior to discharge. ID 
band removed. Patient advised to follow up with PMD. Rx of tamiflu, tramdol, 
prednison, promethazine given. Patient educated on indication of medication 
including possible reaction and side effects. Opportunity to ask questions 
provided and answered.